# Patient Record
Sex: MALE | Race: WHITE | NOT HISPANIC OR LATINO | Employment: OTHER | URBAN - METROPOLITAN AREA
[De-identification: names, ages, dates, MRNs, and addresses within clinical notes are randomized per-mention and may not be internally consistent; named-entity substitution may affect disease eponyms.]

---

## 2017-09-14 RX ORDER — FUROSEMIDE 40 MG/1
40 TABLET ORAL
COMMUNITY
Start: 2017-07-21

## 2017-09-14 RX ORDER — LUBIPROSTONE 24 UG/1
24 CAPSULE ORAL
COMMUNITY
Start: 2017-08-04

## 2017-09-14 RX ORDER — ASPIRIN 325 MG
325 TABLET, DELAYED RELEASE (ENTERIC COATED) ORAL
COMMUNITY
Start: 2017-06-20

## 2017-09-14 RX ORDER — FERROUS SULFATE 325(65) MG
325 TABLET, DELAYED RELEASE (ENTERIC COATED) ORAL
COMMUNITY
Start: 2017-07-21

## 2017-09-14 RX ORDER — TAMSULOSIN HYDROCHLORIDE 0.4 MG/1
0.4 CAPSULE ORAL
COMMUNITY

## 2017-09-14 RX ORDER — SPIRONOLACTONE 25 MG/1
25 TABLET ORAL
COMMUNITY
Start: 2017-06-04

## 2017-09-14 RX ORDER — CARVEDILOL 12.5 MG/1
12.5 TABLET ORAL
COMMUNITY
Start: 2017-06-20

## 2017-09-14 RX ORDER — BACLOFEN 20 MG
500 TABLET ORAL
COMMUNITY

## 2017-09-19 ENCOUNTER — OFFICE VISIT (OUTPATIENT)
Dept: VASCULAR SURGERY | Facility: CLINIC | Age: 72
End: 2017-09-19
Payer: MEDICARE

## 2017-09-19 VITALS
HEIGHT: 68 IN | SYSTOLIC BLOOD PRESSURE: 120 MMHG | DIASTOLIC BLOOD PRESSURE: 60 MMHG | BODY MASS INDEX: 24.02 KG/M2 | WEIGHT: 158.5 LBS | HEART RATE: 50 BPM

## 2017-09-19 DIAGNOSIS — N18.30 CKD (CHRONIC KIDNEY DISEASE) STAGE 3, GFR 30-59 ML/MIN: ICD-10-CM

## 2017-09-19 DIAGNOSIS — Z72.0 TOBACCO ABUSE: Chronic | ICD-10-CM

## 2017-09-19 DIAGNOSIS — I25.10 CORONARY ARTERY DISEASE INVOLVING NATIVE CORONARY ARTERY OF NATIVE HEART WITHOUT ANGINA PECTORIS: ICD-10-CM

## 2017-09-19 DIAGNOSIS — I50.43 ACUTE ON CHRONIC SYSTOLIC AND DIASTOLIC HEART FAILURE, NYHA CLASS 3: ICD-10-CM

## 2017-09-19 DIAGNOSIS — I48.0 PAROXYSMAL ATRIAL FIBRILLATION: ICD-10-CM

## 2017-09-19 DIAGNOSIS — I34.0 NON-RHEUMATIC MITRAL REGURGITATION: ICD-10-CM

## 2017-09-19 DIAGNOSIS — I10 ESSENTIAL HYPERTENSION: Primary | Chronic | ICD-10-CM

## 2017-09-19 DIAGNOSIS — L03.116 CELLULITIS OF LEFT LOWER EXTREMITY: ICD-10-CM

## 2017-09-19 DIAGNOSIS — I83.90 VARICOSE VEIN OF LEG: ICD-10-CM

## 2017-09-19 PROCEDURE — 1126F AMNT PAIN NOTED NONE PRSNT: CPT | Mod: S$GLB,,, | Performed by: THORACIC SURGERY (CARDIOTHORACIC VASCULAR SURGERY)

## 2017-09-19 PROCEDURE — 3008F BODY MASS INDEX DOCD: CPT | Mod: S$GLB,,, | Performed by: THORACIC SURGERY (CARDIOTHORACIC VASCULAR SURGERY)

## 2017-09-19 PROCEDURE — 3078F DIAST BP <80 MM HG: CPT | Mod: S$GLB,,, | Performed by: THORACIC SURGERY (CARDIOTHORACIC VASCULAR SURGERY)

## 2017-09-19 PROCEDURE — 3074F SYST BP LT 130 MM HG: CPT | Mod: S$GLB,,, | Performed by: THORACIC SURGERY (CARDIOTHORACIC VASCULAR SURGERY)

## 2017-09-19 PROCEDURE — 99205 OFFICE O/P NEW HI 60 MIN: CPT | Mod: S$GLB,,, | Performed by: THORACIC SURGERY (CARDIOTHORACIC VASCULAR SURGERY)

## 2017-09-19 PROCEDURE — 99999 PR PBB SHADOW E&M-EST. PATIENT-LVL III: CPT | Mod: PBBFAC,,, | Performed by: THORACIC SURGERY (CARDIOTHORACIC VASCULAR SURGERY)

## 2017-09-19 PROCEDURE — 1159F MED LIST DOCD IN RCRD: CPT | Mod: S$GLB,,, | Performed by: THORACIC SURGERY (CARDIOTHORACIC VASCULAR SURGERY)

## 2017-09-19 RX ORDER — SULFAMETHOXAZOLE AND TRIMETHOPRIM 800; 160 MG/1; MG/1
1 TABLET ORAL 2 TIMES DAILY
Qty: 20 TABLET | Refills: 0 | Status: SHIPPED | OUTPATIENT
Start: 2017-09-19 | End: 2017-11-14

## 2017-09-19 NOTE — LETTER
September 19, 2017      Emmett Sharma MD  [address] [facility] [address]           [facility]  [address] [facility] [address]  Phone: [phone]          Patient: Basil Christopher   MR Number: [id number]   YOB: 1945   Date of Visit: 9/19/2017       Dear Dr. Emmett Sharma:    Thank you for referring Basil Christopher to me for evaluation. Attached you will find relevant portions of my assessment and plan of care.    If you have questions, please do not hesitate to call me. I look forward to following Basil Christopher along with you.    Sincerely,    Mg Walker MD    Enclosure  CC:  No Recipients    If you would like to receive this communication electronically, please contact [email or web address] or [phone] to request more information on [employer] access.    For providers and/or their staff who would like to refer a patient to [facility], please contact us through our one-stop-shop provider referral line, [facility], at [phone].    If you feel you have received this communication in error or would no longer like to receive these types of communications, please e-mail [email or web address]

## 2017-09-19 NOTE — PROGRESS NOTES
CONSULTATION NOTE    CHIEF COMPLAINT:  Shortness of breath with exertion.    REASON FOR CONSULTATION:  Evaluation of mitral valve and coronary artery   disease.    HISTORY OF PRESENT ILLNESS:  The patient is a 71-year-old white male who has had   shortness of breath with exertion for at least the last year.  He denies chest   pain.  He had an abnormal stress test.  Echocardiography revealed mitral   insufficiency and a significantly depressed left ventricular systolic function.    Cardiac catheterization revealed multivessel disease that I will describe   below.  Recently, he has noted significant swelling in the left leg with redness   and tenderness.    PAST MEDICAL HISTORY:  Coronary artery disease, essential hypertension, leg   edema, and paroxysmal atrial fibrillation.    PAST SURGICAL HISTORY:  Tonsillectomy, hernia repair, left hip surgery.    MEDICATIONS:  Include aspirin, Coreg, Fergon, Amitiza, Aldactone, Pacerone,   Prevacid, lisinopril, Xarelto, Flomax and zinc.    ALLERGIES:  Penicillin.    SOCIAL HISTORY:  Positive for previous tobacco use, which she has ceased.    FAMILY HISTORY:  Positive for heart disease in father and mother.    REVIEW OF SYSTEMS:  GENERAL:  No fevers, chills, weight changes.  NECK:  No complaints.  RESPIRATORY:  Positive shortness of breath with exertion, which is stable.  CARDIAC:  Occasional chest tightness, but mostly shortness of breath with   exertion.  GASTROINTESTINAL:  No constipation or melena.  GENITOURINARY:  No dysuria or frequency.  MUSCULOSKELETAL:  Positive weakness in his legs and left hip discomfort.  He   uses a cane to help ambulate.  He has had swelling in his left leg for several   weeks that he believes is slightly worsening.  VASCULAR:  No current claudication.  NEUROLOGIC:  No complaints.    PHYSICAL EXAMINATION:  VITAL SIGNS:  Blood pressure 120/60, heart rate 50, weight 71.9, height 5 feet 8   inches.  GENERAL:  Slender, pleasant man, in no obvious  distress.  HEENT:  Normocephalic, atraumatic.  There is good facial symmetry.  He is   edentulous.  NECK:  Trachea is midline.  There are no carotid bruits.  There is no jugular   venous distention.  CHEST:  No chest wall abnormalities.  HEART:  Regular rate and rhythm with a 2/6 early systolic murmur at the left   sternal border with slight closing snap.  LUNGS:  Clear.  ABDOMEN:  Soft.  No organomegaly.  EXTREMITIES:  There are varicose veins in the lower right leg.  The lower left   leg is significantly edematous and erythematous.  There is slight break in the   skin medially in the lower leg.  The area is minimally tender.  Pitting edema is   present.  NEUROLOGIC:  Alert and oriented x4 with no focal deficits.  VASCULAR:  Pedal pulses are diminished bilaterally.  Varicose veins are present   in the right leg.  Radial pulses are 2+ bilaterally.    STUDIES:  Cardiac catheterization from 08/15/2017 that I reviewed personally,   reveals what appears to be a 70% mid vessel stenosis of the left anterior   descending with an iFR of 0.88.  There is a 90% stenosis in an obtuse marginal   branch of the circumflex.  The right coronary artery is occluded and fills   through left to right and right to right collaterals.  RA 19, RV 52/10, PA   45/15, PCWP 27.  Cardiac output 5.9 liters per minute.  Myocardial perfusion   study from 07/19/2017 reveals a left ventricular ejection fraction of 55% and   reversible ischemia in apical anterior wall.  A 2D echo from June of this year   reveals a thickened mitral valve with moderate-to-severe mitral insufficiency.    IMPRESSION:  1.  Non-rheumatic mitral valve insufficiency.  2.  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  3.  Coronary artery disease, native arteries, native heart without angina.  4.  Abnormal stress test.  5.  Cellulitis, left lower extremity.  6.  Hypertension.  7.  Dyslipidemia.  8.  Chronic kidney disease,  stage III.  9.  Paroxysmal atrial fibrillation.    RECOMMENDATIONS:  Currently, I believe his cellulitis requires treatment before   considering cardiac surgery of any kind.  I will initiate therapy with Bactrim   since he is penicillin allergic.  I will follow him in two weeks.  He should   follow up with his primary care physician and Cardiology for further management   in the interim.      CULLEN/MANN  dd: 09/19/2017 11:51:11 (CDT)  td: 09/20/2017 01:47:55 (CDT)  Doc ID   #[id number]  Job ID #[id number]    CC: Emmett Sharma M.D.

## 2017-09-21 ENCOUNTER — TELEPHONE (OUTPATIENT)
Dept: VASCULAR SURGERY | Facility: CLINIC | Age: 72
End: 2017-09-21

## 2017-09-21 NOTE — TELEPHONE ENCOUNTER
----- Message from Tiny Limon sent at 9/21/2017 10:45 AM CDT -----  Would like to speak to nurse. Please call back at [phone]. thanks

## 2017-09-21 NOTE — TELEPHONE ENCOUNTER
Patient was calling about a 2 week appointment. I had already mailed him on on Wednesday for 10/3 @ 11:30A

## 2017-09-29 RX ORDER — OXYCODONE AND ACETAMINOPHEN 5; 325 MG/1; MG/1
TABLET ORAL
COMMUNITY
Start: 2017-08-23

## 2017-10-03 ENCOUNTER — OFFICE VISIT (OUTPATIENT)
Dept: VASCULAR SURGERY | Facility: CLINIC | Age: 72
End: 2017-10-03
Payer: MEDICARE

## 2017-10-03 VITALS
DIASTOLIC BLOOD PRESSURE: 55 MMHG | WEIGHT: 161.19 LBS | BODY MASS INDEX: 24.43 KG/M2 | HEART RATE: 51 BPM | SYSTOLIC BLOOD PRESSURE: 110 MMHG | HEIGHT: 68 IN

## 2017-10-03 DIAGNOSIS — L03.116 CELLULITIS OF LEFT LOWER EXTREMITY: Primary | ICD-10-CM

## 2017-10-03 DIAGNOSIS — I34.0 NON-RHEUMATIC MITRAL REGURGITATION: ICD-10-CM

## 2017-10-03 DIAGNOSIS — I25.10 CORONARY ARTERY DISEASE INVOLVING NATIVE CORONARY ARTERY OF NATIVE HEART WITHOUT ANGINA PECTORIS: ICD-10-CM

## 2017-10-03 PROCEDURE — 99212 OFFICE O/P EST SF 10 MIN: CPT | Mod: S$GLB,,, | Performed by: THORACIC SURGERY (CARDIOTHORACIC VASCULAR SURGERY)

## 2017-10-03 PROCEDURE — 99999 PR PBB SHADOW E&M-EST. PATIENT-LVL III: CPT | Mod: PBBFAC,,, | Performed by: THORACIC SURGERY (CARDIOTHORACIC VASCULAR SURGERY)

## 2017-10-03 NOTE — PROGRESS NOTES
HISTORY OF PRESENT ILLNESS:  Mr. Christopher is a 71-year-old man with coronary artery   disease, nonrheumatic mitral insufficiency and acute on chronic combined   systolic and diastolic heart failure.  I saw him in consultation two weeks ago   for consideration of cardiac surgery.  At that time, he presented with a   significant cellulitis of the left leg.  He was treated with double strength   Bactrim.  He has had improvement in his leg, although it remains cellulitic.    His discomfort is less and he notices less swelling.    MEDICATIONS AND ALLERGIES:  Reviewed.    PHYSICAL EXAMINATION:  VITAL SIGNS:  Blood pressure 110/55, heart rate 51, weight 161.2 pounds.  EXTREMITIES:  Left lower extremity remains moderately edematous and   erythematous.  The erythema is slightly less than it was present at our last   visit, he remains with pitting edema.    IMPRESSION:  1.  Ongoing cellulitis of the left leg.  2.  Multivessel coronary artery disease with angina.  Currently, he has only   minimal occasional chest pain.  3.  Nonrheumatic mitral valve insufficiency.  4.  Acute on chronic systolic and diastolic heart failure.  5.  Chronic kidney disease, stage III.  6.  Paroxysmal atrial fibrillation.  7.  Hypertension.  8.  Dyslipidemia.    RECOMMENDATIONS:  The patient has not cleared for cellulitis of the left leg.  I   cannot perform surgery without extreme risk for perioperative mediastinitis or   valve infection with this problem.  I would recommend further medical management   for cellulitis.  Also, consider percutaneous intervention on his coronary   arteries if he remains with angina and ongoing cellulitis and then followup of   his mitral valve.      CULLEN/MANN  dd: 10/03/2017 11:31:39 (CDT)  td: 10/03/2017 23:27:29 (CDT)  Doc ID   #[id number]  Job ID #[id number]    CC: Anthony Sharma M.D.

## 2017-11-14 ENCOUNTER — OFFICE VISIT (OUTPATIENT)
Dept: VASCULAR SURGERY | Facility: CLINIC | Age: 72
End: 2017-11-14
Payer: MEDICARE

## 2017-11-14 VITALS
DIASTOLIC BLOOD PRESSURE: 61 MMHG | BODY MASS INDEX: 21.78 KG/M2 | WEIGHT: 143.75 LBS | HEIGHT: 68 IN | HEART RATE: 48 BPM | SYSTOLIC BLOOD PRESSURE: 120 MMHG

## 2017-11-14 DIAGNOSIS — N18.30 CKD (CHRONIC KIDNEY DISEASE) STAGE 3, GFR 30-59 ML/MIN: ICD-10-CM

## 2017-11-14 DIAGNOSIS — I50.43 ACUTE ON CHRONIC SYSTOLIC AND DIASTOLIC HEART FAILURE, NYHA CLASS 3: ICD-10-CM

## 2017-11-14 DIAGNOSIS — I83.90 VARICOSE VEIN OF LEG: ICD-10-CM

## 2017-11-14 DIAGNOSIS — I48.0 PAROXYSMAL ATRIAL FIBRILLATION: ICD-10-CM

## 2017-11-14 DIAGNOSIS — I34.0 NON-RHEUMATIC MITRAL REGURGITATION: Primary | ICD-10-CM

## 2017-11-14 DIAGNOSIS — I10 ESSENTIAL HYPERTENSION: Chronic | ICD-10-CM

## 2017-11-14 PROBLEM — I25.111 CORONARY ARTERY DISEASE INVOLVING NATIVE CORONARY ARTERY OF NATIVE HEART WITH ANGINA PECTORIS WITH DOCUMENTED SPASM: Status: ACTIVE | Noted: 2017-09-19

## 2017-11-14 PROBLEM — L03.116 CELLULITIS OF LEFT LOWER EXTREMITY: Status: RESOLVED | Noted: 2017-09-19 | Resolved: 2017-11-14

## 2017-11-14 PROCEDURE — 99999 PR PBB SHADOW E&M-EST. PATIENT-LVL III: CPT | Mod: PBBFAC,,, | Performed by: THORACIC SURGERY (CARDIOTHORACIC VASCULAR SURGERY)

## 2017-11-14 PROCEDURE — 99215 OFFICE O/P EST HI 40 MIN: CPT | Mod: S$GLB,,, | Performed by: THORACIC SURGERY (CARDIOTHORACIC VASCULAR SURGERY)

## 2017-11-14 NOTE — PROGRESS NOTES
CLINIC NOTE    CHIEF COMPLAINT:  Shortness of breath with exertion.    HISTORY  OF PRESENT ILLNESS:  Mr. Christopher is a 71-year-old white male with   exertional dyspnea over the last year.  He has had occasional, but minimal,   chest pain with activity.  Echocardiography has revealed severe mitral valve   insufficiency with a depressed left ventricular systolic function.  Cardiac   catheterization reveals multivessel coronary artery disease.  He has had   paroxysmal atrial fibrillation, but has not received anticoagulants.  He   developed cellulitis of the left leg, which has been treated with oral   antibiotics over the last six to eight weeks with resolution of his complaints   of swelling, pain, and redness in the left leg.    PAST MEDICAL HISTORY:  Coronary artery disease, essential hypertension, lower   extremity edema, paroxysmal atrial fibrillation, varicose veins in the legs.    PAST SURGICAL HISTORY:  Tonsillectomy, hernia repair, left hip surgery, cardiac   catheterization.    MEDICATIONS:  Currently include aspirin, Coreg, lisinopril, Prevacid, Aldactone,   Flomax and amiodarone.  Xarelto had been discontinued.    ALLERGIES:  Penicillin.    SOCIAL HISTORY:  The patient ceased cigarette use in the past.    FAMILY HISTORY:  Positive for heart disease in father and mother.    REVIEW OF SYSTEMS:  GENERAL:  No fevers, chills, night sweats or weight changes.  HEENT:  No visual field changes or hoarseness.  NECK:  No complaints.  RESPIRATORY:  Positive shortness of breath with exertion.  CARDIAC:  Occasional chest tightness with exertion.  Positive shortness of   breath with exertion.  GASTROINTESTINAL:  No constipation or melena.  GENITOURINARY:  No dysuria or frequency.  MUSCULOSKELETAL:  No arthralgias.  VASCULAR:  No claudication.  He has had some varicose veins in the lower legs.  NEUROLOGIC:  No lateralizing complaints.    PHYSICAL EXAMINATION:  VITAL SIGNS:  Blood pressure 120/61, heart rate 58, weight 65.2  kilograms,   height 5 feet 8 inches.  GENERAL:  Pleasant man, in no obvious distress.  HEENT:  Normocephalic, atraumatic.  There is good facial symmetry.  There is no   obvious periodontal disease.  NECK:  Trachea is midline.  There are no carotid bruits.  There is no jugular   venous distention.  CHEST:  No chest wall abnormalities.  HEART:  Regular rate and rhythm with a II/VI systolic murmur at the left lower   sternal border.  LUNGS:  Clear bilaterally.  ABDOMEN:  Soft.  No organomegaly.  EXTREMITIES:  Varicose veins are present in the right lower leg.  There are some   venous stasis change remaining in the left lower leg, but his cellulitis and   swelling have resolved.  There is minimal hyperemia remaining of the skin.  NEUROLOGIC:  Alert and oriented x4 with no focal deficits.  VASCULAR:  Varicose veins are present in the right lower leg.  Radial pulses 2+   bilaterally.  Pedal pulses are diminished.    STUDIES:  Cardiac catheterization on 08/15/2017 at [place] reveals a 70% left   anterior descending lesion with an IFR of 0.88.  There is a 90% stenosis at the   ostium of a large obtuse marginal branch of the circumflex.  The right coronary   artery is occluded and fills through left to right collaterals.  PCWP 27, RA   19, RV 52/10, PA 45/15.  Myocardial perfusion study from 07/19/2017 reveals left   ventricular ejection fraction of 55% and reversible ischemia in apical anterior   wall.  2D echocardiogram in June 2017 reveals thickened mitral valve leaflets   with moderate to severe mitral insufficiency.    IMPRESSION:  1.  Non-rheumatic mitral valve insufficiency.  2.  Acute on chronic systolic and diastolic heart failure.  3.  Coronary artery disease, native arteries, native heart with angina.  4.  Angina of effort.  5.  Abnormal stress test.  6.  Essential hypertension.  7.  Chronic kidney disease, stage III.  8.  Paroxysmal atrial fibrillation.  9.  Dyslipidemia.  10.  Resolved cellulitis, left  leg.    PLAN:  The patient will undergo mitral valve procedure (either repair or   replacement with a tissue valve), coronary artery bypass x3, Maze procedure with   resection of left atrial appendage, and intraoperative transesophageal   echocardiogram.  I have discussed the benefits as well as risks of surgery   including death, bleeding, infection, stroke, arrhythmias, heart failure, lung   failure, kidney failure and need for transfusion.  He gives informed consent to   proceed.      CULLEN/MANN  dd: 11/14/2017 12:15:16 (CST)  td: 11/15/2017 03:51:58 (CST)  Doc ID   #[id number]  Job ID #[id number]    CC: Emmett Sharma M.D.

## 2017-11-27 ENCOUNTER — TELEPHONE (OUTPATIENT)
Dept: VASCULAR SURGERY | Facility: CLINIC | Age: 72
End: 2017-11-27

## 2017-11-27 NOTE — TELEPHONE ENCOUNTER
----- Message from Tiny West sent at 11/27/2017  8:39 AM CST -----  Patient state he would like to speak to nurse regarding a surgery. Please adv/call [phone].//thanks. cw

## 2017-12-05 ENCOUNTER — TELEPHONE (OUTPATIENT)
Dept: VASCULAR SURGERY | Facility: CLINIC | Age: 72
End: 2017-12-05

## 2017-12-05 NOTE — TELEPHONE ENCOUNTER
----- Message from Racquel Ridley sent at 12/5/2017  9:27 AM CST -----  Patient states that he cannot be at the hospital tomorrow before 7:00, but he is going to try to get there for 6:30.  He would like for you to call him as soon as possible at [phone].                                 cruz

## 2017-12-05 NOTE — TELEPHONE ENCOUNTER
Spoke with patient and pre-op department. Mr. Christopher still to arrive at 7am and will have T&M redrawn. Case will begin at 8:30. Ok if blood is not ready at that time.

## 2017-12-06 ENCOUNTER — OUTSIDE PLACE OF SERVICE (OUTPATIENT)
Dept: ADMINISTRATIVE | Facility: OTHER | Age: 72
End: 2017-12-06
Payer: MEDICARE

## 2017-12-06 PROCEDURE — 33533 CABG ARTERIAL SINGLE: CPT | Mod: 51,AS,, | Performed by: NURSE PRACTITIONER

## 2017-12-06 PROCEDURE — 33430 REPLACEMENT OF MITRAL VALVE: CPT | Mod: ,,, | Performed by: THORACIC SURGERY (CARDIOTHORACIC VASCULAR SURGERY)

## 2017-12-06 PROCEDURE — 33430 REPLACEMENT OF MITRAL VALVE: CPT | Mod: AS,,, | Performed by: NURSE PRACTITIONER

## 2017-12-06 PROCEDURE — 33519 CABG ARTERY-VEIN THREE: CPT | Mod: ,,, | Performed by: THORACIC SURGERY (CARDIOTHORACIC VASCULAR SURGERY)

## 2017-12-06 PROCEDURE — 33519 CABG ARTERY-VEIN THREE: CPT | Mod: AS,,, | Performed by: NURSE PRACTITIONER

## 2017-12-06 PROCEDURE — 33508 ENDOSCOPIC VEIN HARVEST: CPT | Mod: ,,, | Performed by: THORACIC SURGERY (CARDIOTHORACIC VASCULAR SURGERY)

## 2017-12-06 PROCEDURE — 33508 ENDOSCOPIC VEIN HARVEST: CPT | Mod: AS,,, | Performed by: NURSE PRACTITIONER

## 2017-12-06 PROCEDURE — 33259 ABLATE ATRIA W/BYPASS ADD-ON: CPT | Mod: AS,,, | Performed by: NURSE PRACTITIONER

## 2017-12-06 PROCEDURE — 33533 CABG ARTERIAL SINGLE: CPT | Mod: 51,,, | Performed by: THORACIC SURGERY (CARDIOTHORACIC VASCULAR SURGERY)

## 2017-12-06 PROCEDURE — 33259 ABLATE ATRIA W/BYPASS ADD-ON: CPT | Mod: ,,, | Performed by: THORACIC SURGERY (CARDIOTHORACIC VASCULAR SURGERY)

## 2017-12-10 ENCOUNTER — OUTSIDE PLACE OF SERVICE (OUTPATIENT)
Dept: ADMINISTRATIVE | Facility: OTHER | Age: 72
End: 2017-12-10
Payer: MEDICARE

## 2017-12-10 PROCEDURE — 36556 INSERT NON-TUNNEL CV CATH: CPT | Mod: 79,,, | Performed by: THORACIC SURGERY (CARDIOTHORACIC VASCULAR SURGERY)

## 2018-01-03 ENCOUNTER — TELEPHONE (OUTPATIENT)
Dept: VASCULAR SURGERY | Facility: CLINIC | Age: 73
End: 2018-01-03

## 2018-01-03 NOTE — TELEPHONE ENCOUNTER
----- Message from Karolyn Christensen sent at 1/3/2018  1:03 PM CST -----  Laurel Leroy with [facility] is requesting a return call to confirm doctor will sign death certificate and how she will get it to him contact her at [phone].    Thank you

## 2018-01-10 NOTE — TELEPHONE ENCOUNTER
Death certificate received in the mail. Dr Hermosillo filled out and will mail it back to [facility]